# Patient Record
Sex: FEMALE | Race: WHITE | Employment: FULL TIME | URBAN - METROPOLITAN AREA
[De-identification: names, ages, dates, MRNs, and addresses within clinical notes are randomized per-mention and may not be internally consistent; named-entity substitution may affect disease eponyms.]

---

## 2022-03-08 ENCOUNTER — OFFICE VISIT (OUTPATIENT)
Dept: OBGYN CLINIC | Facility: CLINIC | Age: 42
End: 2022-03-08
Payer: COMMERCIAL

## 2022-03-08 VITALS
HEIGHT: 67.5 IN | BODY MASS INDEX: 24.05 KG/M2 | HEART RATE: 87 BPM | DIASTOLIC BLOOD PRESSURE: 83 MMHG | SYSTOLIC BLOOD PRESSURE: 114 MMHG | WEIGHT: 155 LBS

## 2022-03-08 DIAGNOSIS — Z87.59 HISTORY OF GESTATIONAL HYPERTENSION: ICD-10-CM

## 2022-03-08 DIAGNOSIS — N92.6 MISSED MENSES: Primary | ICD-10-CM

## 2022-03-08 DIAGNOSIS — O34.219 PREVIOUS CESAREAN DELIVERY AFFECTING PREGNANCY: ICD-10-CM

## 2022-03-08 PROBLEM — O09.523 AMA (ADVANCED MATERNAL AGE) MULTIGRAVIDA 35+, THIRD TRIMESTER: Status: ACTIVE | Noted: 2022-03-08

## 2022-03-08 LAB
CONTROL LINE PRESENT WITH A CLEAR BACKGROUND (YES/NO): YES YES/NO
PREGNANCY TEST, URINE: POSITIVE

## 2022-03-08 PROCEDURE — 99203 OFFICE O/P NEW LOW 30 MIN: CPT | Performed by: ADVANCED PRACTICE MIDWIFE

## 2022-03-08 PROCEDURE — 3008F BODY MASS INDEX DOCD: CPT | Performed by: ADVANCED PRACTICE MIDWIFE

## 2022-03-08 PROCEDURE — 81025 URINE PREGNANCY TEST: CPT | Performed by: ADVANCED PRACTICE MIDWIFE

## 2022-03-08 PROCEDURE — 3074F SYST BP LT 130 MM HG: CPT | Performed by: ADVANCED PRACTICE MIDWIFE

## 2022-03-08 PROCEDURE — 3079F DIAST BP 80-89 MM HG: CPT | Performed by: ADVANCED PRACTICE MIDWIFE

## 2022-03-08 RX ORDER — ASCORBIC ACID, CHOLECALCIFEROL, .ALPHA.-TOCOPHEROL ACETATE, DL-, PYRIDOXINE, FOLIC ACID, CYANOCOBALAMIN, CALCIUM, FERROUS FUMARATE, MAGNESIUM, DOCONEXENT 85; 200; 10; 25; 1; 12; 140; 27; 45; 300 [IU]/1; [IU]/1; [IU]/1; [IU]/1; MG/1; UG/1; MG/1; MG/1; MG/1; MG/1
CAPSULE, GELATIN COATED ORAL
COMMUNITY

## 2022-03-18 ENCOUNTER — HOSPITAL ENCOUNTER (OUTPATIENT)
Dept: ULTRASOUND IMAGING | Facility: HOSPITAL | Age: 42
Discharge: HOME | End: 2022-03-18
Attending: ADVANCED PRACTICE MIDWIFE
Payer: COMMERCIAL

## 2022-03-18 ENCOUNTER — LAB ENCOUNTER (OUTPATIENT)
Dept: LAB | Facility: HOSPITAL | Age: 42
End: 2022-03-18
Attending: ADVANCED PRACTICE MIDWIFE
Payer: COMMERCIAL

## 2022-03-18 ENCOUNTER — TELEPHONE (OUTPATIENT)
Dept: OBGYN CLINIC | Facility: CLINIC | Age: 42
End: 2022-03-18

## 2022-03-18 DIAGNOSIS — N92.6 MISSED MENSES: ICD-10-CM

## 2022-03-18 DIAGNOSIS — O36.80X0 PREGNANCY WITH INCONCLUSIVE FETAL VIABILITY, SINGLE OR UNSPECIFIED FETUS: ICD-10-CM

## 2022-03-18 LAB
ALBUMIN SERPL-MCNC: 3.8 G/DL (ref 3.4–5)
ALBUMIN/GLOB SERPL: 1.2 {RATIO} (ref 1–2)
ALP LIVER SERPL-CCNC: 40 U/L
ALT SERPL-CCNC: 18 U/L
ANION GAP SERPL CALC-SCNC: 6 MMOL/L (ref 0–18)
AST SERPL-CCNC: 11 U/L (ref 15–37)
B-HCG SERPL-ACNC: ABNORMAL MIU/ML
BILIRUB SERPL-MCNC: 0.3 MG/DL (ref 0.1–2)
BUN/CREAT SERPL: 16.7 (ref 10–20)
CALCIUM BLD-MCNC: 9 MG/DL (ref 8.5–10.1)
CHLORIDE SERPL-SCNC: 107 MMOL/L (ref 98–112)
CO2 SERPL-SCNC: 25 MMOL/L (ref 21–32)
CREAT BLD-MCNC: 0.66 MG/DL
FASTING STATUS PATIENT QL REPORTED: NO
GLOBULIN PLAS-MCNC: 3.2 G/DL (ref 2.8–4.4)
GLUCOSE BLD-MCNC: 92 MG/DL (ref 70–99)
OSMOLALITY SERPL CALC.SUM OF ELEC: 285 MOSM/KG (ref 275–295)
POTASSIUM SERPL-SCNC: 3.9 MMOL/L (ref 3.5–5.1)
PROT SERPL-MCNC: 7 G/DL (ref 6.4–8.2)
RH BLOOD TYPE: POSITIVE
SODIUM SERPL-SCNC: 138 MMOL/L (ref 136–145)

## 2022-03-18 PROCEDURE — 76801 OB US < 14 WKS SINGLE FETUS: CPT | Performed by: ADVANCED PRACTICE MIDWIFE

## 2022-03-18 PROCEDURE — 36415 COLL VENOUS BLD VENIPUNCTURE: CPT

## 2022-03-18 PROCEDURE — 86901 BLOOD TYPING SEROLOGIC RH(D): CPT

## 2022-03-18 PROCEDURE — 84702 CHORIONIC GONADOTROPIN TEST: CPT | Performed by: ADVANCED PRACTICE MIDWIFE

## 2022-03-18 PROCEDURE — 80053 COMPREHEN METABOLIC PANEL: CPT | Performed by: ADVANCED PRACTICE MIDWIFE

## 2022-03-18 PROCEDURE — 76817 TRANSVAGINAL US OBSTETRIC: CPT | Performed by: ADVANCED PRACTICE MIDWIFE

## 2022-03-18 PROCEDURE — 86900 BLOOD TYPING SEROLOGIC ABO: CPT

## 2022-03-18 NOTE — TELEPHONE ENCOUNTER
Spoke to patient while she was in ultrasound. Reviewed ultrasound showing 6.1wk fetal pole with no FHTs which is suspicious for pregnancy loss. Patient states she was certain of last period and had about 30 day cycles, which would have GA of 7.4wks. She states she has not had any bleeding or cramping but she has had a loss of pregnancy symptoms over the last week or so. Discussed completing serial Hcgs to help determine if this is a pregnancy loss. Patient instructed to go to lab to complete Hcg today and again on Sunday. Will then contact patient with results and plan of care. Informed patient that if this is a pregnancy loss, a miscarriage at this gestational age will most likely look like a heavy period with cramping. Patient instructed to call if she has any concerns. Reviewed warning signs of heavy bleeding or fever.

## 2022-03-20 ENCOUNTER — LAB ENCOUNTER (OUTPATIENT)
Dept: LAB | Facility: HOSPITAL | Age: 42
End: 2022-03-20
Attending: ADVANCED PRACTICE MIDWIFE
Payer: COMMERCIAL

## 2022-03-20 LAB — B-HCG SERPL-ACNC: ABNORMAL MIU/ML

## 2022-03-20 PROCEDURE — 36415 COLL VENOUS BLD VENIPUNCTURE: CPT | Performed by: ADVANCED PRACTICE MIDWIFE

## 2022-03-20 PROCEDURE — 84702 CHORIONIC GONADOTROPIN TEST: CPT | Performed by: ADVANCED PRACTICE MIDWIFE

## 2022-03-22 ENCOUNTER — MOBILE ENCOUNTER (OUTPATIENT)
Dept: OBGYN CLINIC | Facility: CLINIC | Age: 42
End: 2022-03-22

## 2022-03-22 ENCOUNTER — TELEPHONE (OUTPATIENT)
Dept: OBGYN CLINIC | Facility: CLINIC | Age: 42
End: 2022-03-22

## 2022-03-22 NOTE — TELEPHONE ENCOUNTER
Pt states she had an US and didn't go well and had blood test done but hasn't heard back on results.  Please advies

## 2022-03-23 ENCOUNTER — TELEPHONE (OUTPATIENT)
Dept: OBGYN CLINIC | Facility: CLINIC | Age: 42
End: 2022-03-23

## 2022-03-23 LAB — HCG, TOTAL, QN: ABNORMAL MIU/ML

## 2022-03-23 NOTE — TELEPHONE ENCOUNTER
----- Message from Jayden Mosqueda CNM sent at 3/23/2022 10:54 AM CDT -----  Just spoke to patient and recommended follow-up ultrasound on Monday for likely confirmation of pregnancy loss. She may call if she is not able to schedule for Monday. Either way, I think it would probably be good for her to have an appointment with CNMs following ultrasound to discuss options. I did not discuss this with her over the phone. Please call to recommend and schedule once ultrasound is scheduled. Thanks!

## 2022-03-23 NOTE — TELEPHONE ENCOUNTER
Spoke w/ pt & advised Jose Armando Barillas would liek her to see cnm in person to discuss results & poc. appt scheduled for 445p as only available time slot but instructed pt to come when her u/s is completed & we will have her seen.  Pt verbalized an understanding & agrees w/ plan

## 2022-03-23 NOTE — TELEPHONE ENCOUNTER
Phone call to patient. Hcg continues to increase but not in a reassuring way. Discussed recommendation of follow-up ultrasound on Monday. Patient to schedule. Patient to call with bleeding. Answered patient questions.

## 2022-03-28 ENCOUNTER — OFFICE VISIT (OUTPATIENT)
Dept: OBGYN CLINIC | Facility: CLINIC | Age: 42
End: 2022-03-28
Payer: COMMERCIAL

## 2022-03-28 ENCOUNTER — HOSPITAL ENCOUNTER (OUTPATIENT)
Dept: ULTRASOUND IMAGING | Facility: HOSPITAL | Age: 42
Discharge: HOME | End: 2022-03-28
Attending: ADVANCED PRACTICE MIDWIFE
Payer: COMMERCIAL

## 2022-03-28 DIAGNOSIS — O36.80X0 PREGNANCY WITH INCONCLUSIVE FETAL VIABILITY, SINGLE OR UNSPECIFIED FETUS: ICD-10-CM

## 2022-03-28 DIAGNOSIS — O02.1 MISSED ABORTION: Primary | ICD-10-CM

## 2022-03-28 PROCEDURE — 76817 TRANSVAGINAL US OBSTETRIC: CPT | Performed by: ADVANCED PRACTICE MIDWIFE

## 2022-03-28 PROCEDURE — 76801 OB US < 14 WKS SINGLE FETUS: CPT | Performed by: ADVANCED PRACTICE MIDWIFE

## 2022-03-28 PROCEDURE — 99213 OFFICE O/P EST LOW 20 MIN: CPT | Performed by: ADVANCED PRACTICE MIDWIFE

## 2022-03-28 RX ORDER — MISOPROSTOL 200 UG/1
800 TABLET ORAL 2 TIMES DAILY PRN
Qty: 8 TABLET | Refills: 0 | Status: CANCELLED | OUTPATIENT
Start: 2022-03-28

## 2022-03-28 RX ORDER — MISOPROSTOL 200 UG/1
800 TABLET ORAL 2 TIMES DAILY PRN
Qty: 8 TABLET | Refills: 0 | Status: SHIPPED | OUTPATIENT
Start: 2022-03-28

## 2022-03-29 ENCOUNTER — TELEPHONE (OUTPATIENT)
Dept: OBGYN CLINIC | Facility: CLINIC | Age: 42
End: 2022-03-29

## 2022-03-29 NOTE — TELEPHONE ENCOUNTER
Pt is calling on script  Misoprostol    Pt has a question on it  ,  Pt was told she can go it oral or vaginally ,   Has some question ,

## 2022-03-29 NOTE — TELEPHONE ENCOUNTER
Pt states \"my baby is not a baby anymore. \" ultrasound results consistent w/ fetal demise yesterday. 9wks by dates, 6wks by ultrasound. Discussed prescribed rx & use of miso vaginally vs orally/buccal. Reviewed SAB precautions. Pt states she just wants to get it over with. Support given. Encouraged pt to call w/ any questions or concerns.  Pt verbalized an understanding &  agrees w/ plan

## 2022-04-15 ENCOUNTER — TELEPHONE (OUTPATIENT)
Dept: OBGYN CLINIC | Facility: CLINIC | Age: 42
End: 2022-04-15

## 2022-04-18 ENCOUNTER — LAB ENCOUNTER (OUTPATIENT)
Dept: LAB | Facility: HOSPITAL | Age: 42
End: 2022-04-18
Attending: ADVANCED PRACTICE MIDWIFE
Payer: COMMERCIAL

## 2022-04-18 ENCOUNTER — OFFICE VISIT (OUTPATIENT)
Dept: OBGYN CLINIC | Facility: CLINIC | Age: 42
End: 2022-04-18
Payer: COMMERCIAL

## 2022-04-18 VITALS
SYSTOLIC BLOOD PRESSURE: 118 MMHG | DIASTOLIC BLOOD PRESSURE: 84 MMHG | BODY MASS INDEX: 25 KG/M2 | WEIGHT: 161.38 LBS | HEART RATE: 103 BPM

## 2022-04-18 DIAGNOSIS — O03.9 MISCARRIAGE: Primary | ICD-10-CM

## 2022-04-18 DIAGNOSIS — N93.9 VAGINAL BLEEDING: ICD-10-CM

## 2022-04-18 LAB — B-HCG SERPL-ACNC: 705 MIU/ML

## 2022-04-18 PROCEDURE — 3074F SYST BP LT 130 MM HG: CPT | Performed by: ADVANCED PRACTICE MIDWIFE

## 2022-04-18 PROCEDURE — 36415 COLL VENOUS BLD VENIPUNCTURE: CPT | Performed by: ADVANCED PRACTICE MIDWIFE

## 2022-04-18 PROCEDURE — 84702 CHORIONIC GONADOTROPIN TEST: CPT | Performed by: ADVANCED PRACTICE MIDWIFE

## 2022-04-18 PROCEDURE — 3079F DIAST BP 80-89 MM HG: CPT | Performed by: ADVANCED PRACTICE MIDWIFE

## 2022-04-18 PROCEDURE — 99213 OFFICE O/P EST LOW 20 MIN: CPT | Performed by: ADVANCED PRACTICE MIDWIFE

## 2022-04-20 ENCOUNTER — TELEPHONE (OUTPATIENT)
Dept: OBGYN CLINIC | Facility: CLINIC | Age: 42
End: 2022-04-20

## 2022-04-20 LAB
GENITAL VAGINOSIS SCREEN: NEGATIVE
TRICHOMONAS SCREEN: NEGATIVE

## 2022-04-20 NOTE — TELEPHONE ENCOUNTER
----- Message from Espinoza Adkins CNM sent at 4/19/2022  9:10 PM CDT -----  Please call patient. Let her know her Hcg has decreased to 705. She should repeat Hcg in 1 week. If she is still having bleeding next week, she should call our office. Thanks!

## 2022-04-20 NOTE — TELEPHONE ENCOUNTER
Notified pt of results & instructions per Jj Willis.  Pt verbalized an understanding & agrees w/ plan

## 2022-04-27 ENCOUNTER — TELEPHONE (OUTPATIENT)
Dept: OBGYN CLINIC | Facility: CLINIC | Age: 42
End: 2022-04-27

## 2022-04-27 LAB — HCG, TOTAL, QN: 189 MIU/ML

## 2022-04-27 NOTE — TELEPHONE ENCOUNTER
Pt states she completed repeat quant yesterday. Vag bleeding was tapering & mucusy per pt. Pt states it is now like a period & wants to know if she is getting her period or if its a continuation of her SAB. Denies any cramping or soaking of pad. Advised I will send message to Argelia Steinberg (per pt preference) who is out of the office until tomorrow.  Pt verbalized an understanding    Routed to Argelia Steinberg

## 2022-04-28 NOTE — TELEPHONE ENCOUNTER
Phone call to patient. Reviewed decreasing Hcg, 189 on Tuesday. Patient states that she started having bleeding like a period on Tuesday and it has continued. She states it seems just like a normal period for her. Discussed repeating Hcg Fri or Sat to ensure it is still dropping. Patient also instructed to monitor bleeding. If experiencing heavy bleeding or continues beyond next week, to call for further evaluation. Patient states understanding.

## 2022-05-03 ENCOUNTER — TELEPHONE (OUTPATIENT)
Dept: OBGYN CLINIC | Facility: CLINIC | Age: 42
End: 2022-05-03

## 2022-05-03 ENCOUNTER — TELEPHONE (OUTPATIENT)
Dept: OBGYN UNIT | Facility: HOSPITAL | Age: 42
End: 2022-05-03

## 2022-05-03 LAB — HCG, TOTAL, QN: 67 MIU/ML

## 2022-05-03 NOTE — TELEPHONE ENCOUNTER
----- Message from Cristina Sexton CNM sent at 5/3/2022 11:37 AM CDT -----  Please call patient. Her Hcg continues to drop. She should plan to go in for another check in 2 weeks. Also check on her bleeding. It had sounded like she had her period last week, just want to be sure that it progressed and stopped like a normal period would. Thanks!

## 2022-05-03 NOTE — TELEPHONE ENCOUNTER
Notified pt of results & instructions per [address]. Pt states her bleeding is not tapering like a normal period. Is bright red at times. States it is not heavy & needs to change a thin pad every few hours, not because it is soaked but because it is dirty. Notes increased bleeding w/ walking & when voiding. Instructed pt to call in a week if her bleeding has not changed. Desires to repeat quant at [facility]. Order placed.  Pt verbalized an understanding & agrees w/ plan

## 2022-05-11 ENCOUNTER — TELEPHONE (OUTPATIENT)
Dept: OBGYN CLINIC | Facility: CLINIC | Age: 42
End: 2022-05-11

## 2022-05-11 NOTE — TELEPHONE ENCOUNTER
Pt reports still bleeding from SAB. Advised pt she should schedule an appt to be evaluated in person. appt scheduled w/ EMILY.  Pt verbalized an understanding & agrees w/ plan

## 2022-05-12 ENCOUNTER — LAB ENCOUNTER (OUTPATIENT)
Dept: LAB | Facility: HOSPITAL | Age: 42
End: 2022-05-12
Attending: ADVANCED PRACTICE MIDWIFE
Payer: COMMERCIAL

## 2022-05-12 ENCOUNTER — OFFICE VISIT (OUTPATIENT)
Dept: OBGYN CLINIC | Facility: CLINIC | Age: 42
End: 2022-05-12
Payer: COMMERCIAL

## 2022-05-12 VITALS
SYSTOLIC BLOOD PRESSURE: 117 MMHG | HEIGHT: 67 IN | DIASTOLIC BLOOD PRESSURE: 78 MMHG | HEART RATE: 72 BPM | BODY MASS INDEX: 25.27 KG/M2 | WEIGHT: 161 LBS

## 2022-05-12 DIAGNOSIS — N93.9 VAGINAL BLEEDING, ABNORMAL: ICD-10-CM

## 2022-05-12 DIAGNOSIS — O03.9 MISCARRIAGE: Primary | ICD-10-CM

## 2022-05-12 LAB — B-HCG SERPL-ACNC: 9 MIU/ML

## 2022-05-12 PROCEDURE — 99212 OFFICE O/P EST SF 10 MIN: CPT | Performed by: ADVANCED PRACTICE MIDWIFE

## 2022-05-12 PROCEDURE — 3078F DIAST BP <80 MM HG: CPT | Performed by: ADVANCED PRACTICE MIDWIFE

## 2022-05-12 PROCEDURE — 3008F BODY MASS INDEX DOCD: CPT | Performed by: ADVANCED PRACTICE MIDWIFE

## 2022-05-12 PROCEDURE — 36415 COLL VENOUS BLD VENIPUNCTURE: CPT | Performed by: ADVANCED PRACTICE MIDWIFE

## 2022-05-12 PROCEDURE — 84702 CHORIONIC GONADOTROPIN TEST: CPT | Performed by: ADVANCED PRACTICE MIDWIFE

## 2022-05-12 PROCEDURE — 3074F SYST BP LT 130 MM HG: CPT | Performed by: ADVANCED PRACTICE MIDWIFE

## 2022-05-13 ENCOUNTER — TELEPHONE (OUTPATIENT)
Dept: OBGYN CLINIC | Facility: CLINIC | Age: 42
End: 2022-05-13

## 2022-05-13 NOTE — TELEPHONE ENCOUNTER
Phone call to patient to review continued decrease in Hcg level, now 9. Patient states her bleeding is barely present today, just some light brown spotting. Her bleeding has not been as light as it has been the last 3 days throughout the duration. I did inform her that I discussed her prolonged bleeding and Hcg level with Dr. Sandra Barnes and he said she could just need more time or she could still have retained products of conception that are no longer creating Hcg. He suggested planning to do an ultrasound. Patient was amenable to this. Desires to continue to monitor through the weekend and if bleeding is still present on Monday will schedule ultrasound. Ultrasound order placed. Answered all patient questions and reviewed warning signs.

## 2022-05-17 ENCOUNTER — TELEPHONE (OUTPATIENT)
Dept: OBGYN CLINIC | Facility: CLINIC | Age: 42
End: 2022-05-17

## 2022-05-17 NOTE — TELEPHONE ENCOUNTER
Pt had requested ultrasound for bleeding and wanted to update midwvies with the bleeding.     Please advise

## 2022-05-17 NOTE — TELEPHONE ENCOUNTER
Spoke with pt who states she wanted to notify Navdeep Stewart her vaginal bleeding stopped on Saturday. Pt states she has the slightest tinge of brown only when wiping after going to the bathroom. Pt states she has not scheduled ultrasound. Pt requesting to know if she still needs ultrasound and would like recommendation from Navdeep Stewart. Message routed to Navdeep Stewart.

## (undated) DIAGNOSIS — O03.9 MISCARRIAGE: Primary | ICD-10-CM

## (undated) DIAGNOSIS — O36.80X0 PREGNANCY WITH INCONCLUSIVE FETAL VIABILITY, SINGLE OR UNSPECIFIED FETUS: Primary | ICD-10-CM

## (undated) DIAGNOSIS — O03.9 SAB (SPONTANEOUS ABORTION): Primary | ICD-10-CM

## (undated) DIAGNOSIS — N93.9 ABNORMAL UTERINE BLEEDING: ICD-10-CM